# Patient Record
Sex: FEMALE | Race: ASIAN | NOT HISPANIC OR LATINO | URBAN - METROPOLITAN AREA
[De-identification: names, ages, dates, MRNs, and addresses within clinical notes are randomized per-mention and may not be internally consistent; named-entity substitution may affect disease eponyms.]

---

## 2017-11-20 ENCOUNTER — EMERGENCY (EMERGENCY)
Facility: HOSPITAL | Age: 62
LOS: 1 days | Discharge: HOME | End: 2017-11-20
Attending: EMERGENCY MEDICINE | Admitting: EMERGENCY MEDICINE
Payer: MEDICAID

## 2017-11-20 VITALS
DIASTOLIC BLOOD PRESSURE: 78 MMHG | RESPIRATION RATE: 18 BRPM | SYSTOLIC BLOOD PRESSURE: 117 MMHG | HEART RATE: 93 BPM | OXYGEN SATURATION: 96 % | TEMPERATURE: 98 F

## 2017-11-20 PROCEDURE — 99283 EMERGENCY DEPT VISIT LOW MDM: CPT | Mod: 25

## 2017-11-20 PROCEDURE — 99284 EMERGENCY DEPT VISIT MOD MDM: CPT | Mod: 25

## 2017-11-20 PROCEDURE — 90471 IMMUNIZATION ADMIN: CPT

## 2017-11-20 PROCEDURE — 90715 TDAP VACCINE 7 YRS/> IM: CPT

## 2017-11-20 RX ORDER — TETANUS TOXOID, REDUCED DIPHTHERIA TOXOID AND ACELLULAR PERTUSSIS VACCINE, ADSORBED 5; 2.5; 8; 8; 2.5 [IU]/.5ML; [IU]/.5ML; UG/.5ML; UG/.5ML; UG/.5ML
0.5 SUSPENSION INTRAMUSCULAR ONCE
Qty: 0 | Refills: 0 | Status: COMPLETED | OUTPATIENT
Start: 2017-11-20 | End: 2017-11-20

## 2017-11-20 RX ADMIN — Medication 1 TABLET(S): at 23:58

## 2017-11-20 RX ADMIN — TETANUS TOXOID, REDUCED DIPHTHERIA TOXOID AND ACELLULAR PERTUSSIS VACCINE, ADSORBED 0.5 MILLILITER(S): 5; 2.5; 8; 8; 2.5 SUSPENSION INTRAMUSCULAR at 23:58
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
## 2017-11-21 NOTE — ED PROVIDER NOTE - HEAD SHAPE
normal cephalic/+ swelling over left forehead near the hair line, no step off or deformity mildly tender to palpation.

## 2017-11-21 NOTE — ED PROVIDER NOTE - NEUROLOGICAL, MLM
Alert and oriented, no focal deficits, no motor or sensory deficits.  CN 2-12 intact with normal gait
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
## 2017-11-21 NOTE — ED PROVIDER NOTE - MUSCULOSKELETAL, MLM
no midline spinal tenderness.  5/5 strength b/l UE and LE.  Right index finger full ROM with good strength against resistance

## 2020-11-18 NOTE — ED PROVIDER NOTE - CPE EDP GASTRO NORM
normal... [Headache] : headache [Feeling Fatigued] : feeling fatigued [Sinus Pressure] : sinus pressure [Negative] : Respiratory [Sore Throat] : no sore throat

## 2022-03-09 NOTE — ED ADULT NURSE NOTE - PRIMARY CARE PROVIDER
Physical Therapy  Cash Based Dry Needling Session    Visit: 12  Dry Needling Informed Consent Signed: Yes  Patient has been made aware of the cash based cost associated with each of the above procedures: Yes    SUBJECTIVE   Improved lumbar range of motion into extension, rotation, sidebending, flexion still limited.    OBJECTIVE    +QL, ilisopasis R>L, thoracolumbar paraspinals    Treatment   Education about indications, contraindications and potential side effects completed with patient.  Screen Completed   Precautions Contraindications   Local skin lesions  Lyme disease  Local lymphedema  Severe hyperalgesia/allodynia  Metal allergies: nickel and chromium  Abnormal bleeding tendency  Immunodeficiency and/or compromised immune system  Second or third trimester of pregnancy  Vascular Disease  History of spontaneous pneumothorax Local or systemic infections; including the flu  Over implants  Active cancer  Area of lymphatic compromise  Area of lumpectomy/mastectomy  First trimester of pregnancy     Patient has consented to proceed with the intervention.    Dry Needling:   Needles inserted 16   Needles removed 16   Locations and Needle Size 60 mm T10,12,L2, L4  75 mm QL (1), iliopsoas (1)  75 mm glut med/max   Treatment duration 15 minutes   Patient response No adverse response       ASSESSMENT AND FUTURE RECOMMENDATIONS    Response to treatment: no adverse repsonse  Re-assessment of dysfunctions following intervention demonstrates: reduction in hypertonicity, improved range of motion    Based on the above recommendation is to: Continue Cash Based Service    CASH BASED THERAPY DAILY BILLING   Untimed Procedures:  Dry Needling - Unlisted Physical Medicine/Rehabilitation Service Or Procedure  Total time spent with patient: 15 minutes    
unk

## 2022-08-11 ENCOUNTER — EMERGENCY (EMERGENCY)
Facility: HOSPITAL | Age: 67
LOS: 1 days | Discharge: LEFT BEFORE DISCHARGE | End: 2022-08-11
Admitting: EMERGENCY MEDICINE

## 2022-08-11 VITALS
HEART RATE: 63 BPM | TEMPERATURE: 98 F | RESPIRATION RATE: 18 BRPM | DIASTOLIC BLOOD PRESSURE: 62 MMHG | SYSTOLIC BLOOD PRESSURE: 110 MMHG | OXYGEN SATURATION: 100 %

## 2022-08-11 NOTE — ED ADULT TRIAGE NOTE - CHIEF COMPLAINT QUOTE
Pt reporting to the ED  for hematuria and dysuria since last night. Reports lower abdominal pain radiating to flank. Denies anticoagulation use. No pmh

## 2024-09-06 ENCOUNTER — EMERGENCY (EMERGENCY)
Facility: HOSPITAL | Age: 69
LOS: 1 days | Discharge: HOME | End: 2024-09-06
Attending: STUDENT IN AN ORGANIZED HEALTH CARE EDUCATION/TRAINING PROGRAM
Payer: MEDICARE

## 2024-09-06 VITALS
WEIGHT: 132.28 LBS | SYSTOLIC BLOOD PRESSURE: 135 MMHG | RESPIRATION RATE: 18 BRPM | TEMPERATURE: 98 F | HEART RATE: 79 BPM | OXYGEN SATURATION: 100 % | DIASTOLIC BLOOD PRESSURE: 81 MMHG

## 2024-09-06 VITALS
SYSTOLIC BLOOD PRESSURE: 144 MMHG | OXYGEN SATURATION: 98 % | HEART RATE: 64 BPM | TEMPERATURE: 98 F | DIASTOLIC BLOOD PRESSURE: 76 MMHG | RESPIRATION RATE: 18 BRPM

## 2024-09-06 DIAGNOSIS — Z98.890 OTHER SPECIFIED POSTPROCEDURAL STATES: Chronic | ICD-10-CM

## 2024-09-06 DIAGNOSIS — Z90.49 ACQUIRED ABSENCE OF OTHER SPECIFIED PARTS OF DIGESTIVE TRACT: Chronic | ICD-10-CM

## 2024-09-06 LAB
ALBUMIN SERPL ELPH-MCNC: 3.8 G/DL — SIGNIFICANT CHANGE UP (ref 3.3–5)
ALP SERPL-CCNC: 73 U/L — SIGNIFICANT CHANGE UP (ref 40–120)
ALT FLD-CCNC: 23 U/L — SIGNIFICANT CHANGE UP (ref 10–45)
ANION GAP SERPL CALC-SCNC: 12 MMOL/L — SIGNIFICANT CHANGE UP (ref 5–17)
APPEARANCE UR: CLEAR — SIGNIFICANT CHANGE UP
AST SERPL-CCNC: 14 U/L — SIGNIFICANT CHANGE UP (ref 10–40)
BASOPHILS # BLD AUTO: 0.05 K/UL — SIGNIFICANT CHANGE UP (ref 0–0.2)
BASOPHILS NFR BLD AUTO: 0.6 % — SIGNIFICANT CHANGE UP (ref 0–2)
BILIRUB SERPL-MCNC: 0.5 MG/DL — SIGNIFICANT CHANGE UP (ref 0.2–1.2)
BILIRUB UR-MCNC: NEGATIVE — SIGNIFICANT CHANGE UP
BUN SERPL-MCNC: 9 MG/DL — SIGNIFICANT CHANGE UP (ref 7–23)
CALCIUM SERPL-MCNC: 9.2 MG/DL — SIGNIFICANT CHANGE UP (ref 8.4–10.5)
CHLORIDE SERPL-SCNC: 106 MMOL/L — SIGNIFICANT CHANGE UP (ref 96–108)
CO2 SERPL-SCNC: 21 MMOL/L — LOW (ref 22–31)
COLOR SPEC: YELLOW — SIGNIFICANT CHANGE UP
CREAT SERPL-MCNC: 0.65 MG/DL — SIGNIFICANT CHANGE UP (ref 0.5–1.3)
DIFF PNL FLD: NEGATIVE — SIGNIFICANT CHANGE UP
EGFR: 94 ML/MIN/1.73M2 — SIGNIFICANT CHANGE UP
EOSINOPHIL # BLD AUTO: 0.07 K/UL — SIGNIFICANT CHANGE UP (ref 0–0.5)
EOSINOPHIL NFR BLD AUTO: 0.8 % — SIGNIFICANT CHANGE UP (ref 0–6)
FLUAV AG NPH QL: SIGNIFICANT CHANGE UP
FLUBV AG NPH QL: SIGNIFICANT CHANGE UP
GAS PNL BLDV: SIGNIFICANT CHANGE UP
GLUCOSE SERPL-MCNC: 137 MG/DL — HIGH (ref 70–99)
GLUCOSE UR QL: NEGATIVE MG/DL — SIGNIFICANT CHANGE UP
HCT VFR BLD CALC: 42.7 % — SIGNIFICANT CHANGE UP (ref 34.5–45)
HGB BLD-MCNC: 14 G/DL — SIGNIFICANT CHANGE UP (ref 11.5–15.5)
IMM GRANULOCYTES NFR BLD AUTO: 0.8 % — SIGNIFICANT CHANGE UP (ref 0–0.9)
KETONES UR-MCNC: NEGATIVE MG/DL — SIGNIFICANT CHANGE UP
LEUKOCYTE ESTERASE UR-ACNC: NEGATIVE — SIGNIFICANT CHANGE UP
LIDOCAIN IGE QN: 86 U/L — HIGH (ref 7–60)
LYMPHOCYTES # BLD AUTO: 2.12 K/UL — SIGNIFICANT CHANGE UP (ref 1–3.3)
LYMPHOCYTES # BLD AUTO: 23.5 % — SIGNIFICANT CHANGE UP (ref 13–44)
MCHC RBC-ENTMCNC: 28.3 PG — SIGNIFICANT CHANGE UP (ref 27–34)
MCHC RBC-ENTMCNC: 32.8 GM/DL — SIGNIFICANT CHANGE UP (ref 32–36)
MCV RBC AUTO: 86.3 FL — SIGNIFICANT CHANGE UP (ref 80–100)
MONOCYTES # BLD AUTO: 0.68 K/UL — SIGNIFICANT CHANGE UP (ref 0–0.9)
MONOCYTES NFR BLD AUTO: 7.5 % — SIGNIFICANT CHANGE UP (ref 2–14)
NEUTROPHILS # BLD AUTO: 6.05 K/UL — SIGNIFICANT CHANGE UP (ref 1.8–7.4)
NEUTROPHILS NFR BLD AUTO: 66.8 % — SIGNIFICANT CHANGE UP (ref 43–77)
NITRITE UR-MCNC: NEGATIVE — SIGNIFICANT CHANGE UP
NRBC # BLD: 0 /100 WBCS — SIGNIFICANT CHANGE UP (ref 0–0)
OB PNL STL: NEGATIVE — SIGNIFICANT CHANGE UP
PH UR: 5.5 — SIGNIFICANT CHANGE UP (ref 5–8)
PLATELET # BLD AUTO: 316 K/UL — SIGNIFICANT CHANGE UP (ref 150–400)
POTASSIUM SERPL-MCNC: 3.8 MMOL/L — SIGNIFICANT CHANGE UP (ref 3.5–5.3)
POTASSIUM SERPL-SCNC: 3.8 MMOL/L — SIGNIFICANT CHANGE UP (ref 3.5–5.3)
PROT SERPL-MCNC: 7.2 G/DL — SIGNIFICANT CHANGE UP (ref 6–8.3)
PROT UR-MCNC: NEGATIVE MG/DL — SIGNIFICANT CHANGE UP
RBC # BLD: 4.95 M/UL — SIGNIFICANT CHANGE UP (ref 3.8–5.2)
RBC # FLD: 12.2 % — SIGNIFICANT CHANGE UP (ref 10.3–14.5)
RSV RNA NPH QL NAA+NON-PROBE: SIGNIFICANT CHANGE UP
SARS-COV-2 RNA SPEC QL NAA+PROBE: SIGNIFICANT CHANGE UP
SODIUM SERPL-SCNC: 139 MMOL/L — SIGNIFICANT CHANGE UP (ref 135–145)
SP GR SPEC: 1.01 — SIGNIFICANT CHANGE UP (ref 1–1.03)
TROPONIN T, HIGH SENSITIVITY RESULT: <6 NG/L — SIGNIFICANT CHANGE UP (ref 0–51)
UROBILINOGEN FLD QL: 0.2 MG/DL — SIGNIFICANT CHANGE UP (ref 0.2–1)
WBC # BLD: 9.04 K/UL — SIGNIFICANT CHANGE UP (ref 3.8–10.5)
WBC # FLD AUTO: 9.04 K/UL — SIGNIFICANT CHANGE UP (ref 3.8–10.5)

## 2024-09-06 PROCEDURE — 82330 ASSAY OF CALCIUM: CPT

## 2024-09-06 PROCEDURE — 85018 HEMOGLOBIN: CPT

## 2024-09-06 PROCEDURE — 80053 COMPREHEN METABOLIC PANEL: CPT

## 2024-09-06 PROCEDURE — 74177 CT ABD & PELVIS W/CONTRAST: CPT | Mod: MC

## 2024-09-06 PROCEDURE — 84484 ASSAY OF TROPONIN QUANT: CPT

## 2024-09-06 PROCEDURE — 74177 CT ABD & PELVIS W/CONTRAST: CPT | Mod: 26,MC

## 2024-09-06 PROCEDURE — 82272 OCCULT BLD FECES 1-3 TESTS: CPT

## 2024-09-06 PROCEDURE — 76700 US EXAM ABDOM COMPLETE: CPT | Mod: 26

## 2024-09-06 PROCEDURE — 71045 X-RAY EXAM CHEST 1 VIEW: CPT | Mod: 26

## 2024-09-06 PROCEDURE — 99285 EMERGENCY DEPT VISIT HI MDM: CPT

## 2024-09-06 PROCEDURE — 83605 ASSAY OF LACTIC ACID: CPT

## 2024-09-06 PROCEDURE — 85014 HEMATOCRIT: CPT

## 2024-09-06 PROCEDURE — 82947 ASSAY GLUCOSE BLOOD QUANT: CPT

## 2024-09-06 PROCEDURE — 84295 ASSAY OF SERUM SODIUM: CPT

## 2024-09-06 PROCEDURE — 81003 URINALYSIS AUTO W/O SCOPE: CPT

## 2024-09-06 PROCEDURE — 84132 ASSAY OF SERUM POTASSIUM: CPT

## 2024-09-06 PROCEDURE — 71045 X-RAY EXAM CHEST 1 VIEW: CPT

## 2024-09-06 PROCEDURE — 99284 EMERGENCY DEPT VISIT MOD MDM: CPT | Mod: 25

## 2024-09-06 PROCEDURE — 82435 ASSAY OF BLOOD CHLORIDE: CPT

## 2024-09-06 PROCEDURE — 76700 US EXAM ABDOM COMPLETE: CPT

## 2024-09-06 PROCEDURE — 83690 ASSAY OF LIPASE: CPT

## 2024-09-06 PROCEDURE — 87637 SARSCOV2&INF A&B&RSV AMP PRB: CPT

## 2024-09-06 PROCEDURE — 85025 COMPLETE CBC W/AUTO DIFF WBC: CPT

## 2024-09-06 PROCEDURE — 36000 PLACE NEEDLE IN VEIN: CPT | Mod: XU

## 2024-09-06 PROCEDURE — 82803 BLOOD GASES ANY COMBINATION: CPT

## 2024-09-06 RX ORDER — SODIUM CHLORIDE 9 MG/ML
1000 INJECTION INTRAMUSCULAR; INTRAVENOUS; SUBCUTANEOUS ONCE
Refills: 0 | Status: COMPLETED | OUTPATIENT
Start: 2024-09-06 | End: 2024-09-06

## 2024-09-06 RX ORDER — ASPIRIN 81 MG
0 TABLET, DELAYED RELEASE (ENTERIC COATED) ORAL
Refills: 0 | DISCHARGE

## 2024-09-06 RX ADMIN — SODIUM CHLORIDE 1000 MILLILITER(S): 9 INJECTION INTRAMUSCULAR; INTRAVENOUS; SUBCUTANEOUS at 16:55

## 2024-09-06 NOTE — ED ADULT NURSE NOTE - NSFALLHARMRISKINTERV_ED_ALL_ED

## 2024-09-06 NOTE — ED PROVIDER NOTE - OBJECTIVE STATEMENT
The patient is a 69y Female with PMHx colonic polyps presents with abdominal pain since 8/26 (1.5 weeks) primarily localized to the RUQ, melena, diarrhea, hematuria, dysuria, urinary urgency, difficulty urinating, chills, sweating, fever, runny nose, throat pain, headache, and generalized weakness. On 8/26, she ate crab meat, to which she attributes her illness. On 8/29 and 9/3, she was seen at [facility] emergency department for diarrhea where she was found to have campylobacter with otherwise normal bloodwork aside from monocyte elevation and normal CT abdomen, was prescribed azithromycin, zofran, acetaminophen. On 8/29, she stopped having bowel movements for 5 days, but following this she began experiencing melena and diarrhea resumed. In addition to azithromycin, she has been taking moroxydine, an antiviral medication. In March, she had a routine colonoscopy and was found to have 2 polyps which were removed by her GI DrReanna Carlin. She also has medical history of hypertension, unsure what medications she is taking, and an appendectomy at age 18.

## 2024-09-06 NOTE — ED ADULT NURSE NOTE - OBJECTIVE STATEMENT
1515 pt 69yf aox4 ambulatory mandarin spkg only daughter at bedside/translating c/o 3 days abd pain and bloody stools, has recently chg pmd and not seen or called, vital wnl, in no acute distress able to verbalize concerns, pending

## 2024-09-06 NOTE — ED PROVIDER NOTE - PHYSICAL EXAMINATION
T(C): 36.8 (09-06-24 @ 13:55), Max: 36.8 (09-06-24 @ 13:55)  HR: 79 (09-06-24 @ 13:55) (79 - 79)  BP: 135/81 (09-06-24 @ 13:55) (135/81 - 135/81)  RR: 18 (09-06-24 @ 13:55) (18 - 18)  SpO2: 100% (09-06-24 @ 13:55) (100% - 100%)    CONSTITUTIONAL: Well groomed, no apparent distress  EYES: No conjunctival or scleral injection, non-icteric  ENMT: Oral mucosa with moist membranes. Normal dentition; no pharyngeal injection or exudates  RESP: No respiratory distress, no use of accessory muscles  GI: Tender with RUQ guarding; no palpable masses; no hepatosplenomegaly; no hernia palpated  : Endorses mild costovertebral angle tenderness bilaterally  MSK: Slow gait, able to walk, abnormal gait per daughter.  SKIN: No rashes or ulcers noted; no subcutaneous nodules or induration palpable

## 2024-09-06 NOTE — ED PROVIDER NOTE - PATIENT PORTAL LINK FT
You can access the FollowMyHealth Patient Portal offered by [facility] by registering at the following website: [email or web address]. By joining [facility]’s FollowMyHealth portal, you will also be able to view your health information using other applications (apps) compatible with our system.

## 2024-09-06 NOTE — ED PROVIDER NOTE - NSICDXPASTMEDICALHX_GEN_ALL_CORE_FT
PAST MEDICAL HISTORY:  History of colonic polyps     HTN (hypertension)     No pertinent past medical history

## 2024-09-06 NOTE — ED PROVIDER NOTE - PROGRESS NOTE DETAILS
John Riley MD PGY-3: PO challenge passed. CT shows gastritis. Incidental findings discussed with patient and amoler at bedside. US shows gallbladder sludge. Pain improved. Abdomen nontender. Offerred surg consult here but patient wants to go home. Will DC

## 2024-09-06 NOTE — ED PROVIDER NOTE - ATTENDING CONTRIBUTION TO CARE
69-year-old female who is accompanied by her daughter Jose A and using  for [employer] [place] [id number] presents to the emergency department with abdominal pain for 3 days and had bloody stool.  Patient recently changed primary care doctor and has not seen.  Patient was recently admitted at [facility] on 829 and 9 3 at that time she was found to have Campylobacter.  Patient was prescribed azithromycin.  Patient states that she stopped having bowel movements initially on 829.  She also has been taking an antiviral medication for her symptoms.  Patient follows with a GI doctor shall.  Patient is unsure what medications she takes some of them include Chinese medication.  Patient is awake alert oriented x 3 she has soft abdomen with left lower quadrant tenderness.  She has bilateral CVA tenderness.  Patient will have labs imaging of the abdomen including CT imaging and ultrasound to assess for etiology of her pain.  Patient had a GI bleed study that showed no active bleeding.  Patient with hemoglobin of 14 fecal occult negative patient hemodynamically stable.  Has slightly elevated lipase level.  Patient had ultrasound that reveals biliary sludge patient informed of test results prefers to go home as she tolerated p.o. without any difficulty.  Patient aware that if she develops nausea vomiting or any other concerning symptoms should return to the emergency department immediately for further imaging and likely surgical consultation for possible infection in her gallbladder.

## 2024-09-06 NOTE — ED PROVIDER NOTE - NSFOLLOWUPINSTRUCTIONS_ED_ALL_ED_FT
You were evaluated in the emergency department for abdominal pain.  Your results were discussed with you and are attached.  Please follow-up with your primary care doctor as discussed within the next 2 days.  You may also follow-up with a general surgeon for further evaluation and management of your gallbladder.  You may also follow-up with the urologist for urinary symptoms.  You did not have any evidence of blood in your stool at this time and you did not have a urinary tract infection at this time.    You may take 975 mg Tylenol (acetaminophen) every six hours as needed for pain.    You can expect a phone call within the next 2-3 business days to help you make an appointment. Clinic information is also attached. You may call yourself to schedule an appointment  if you do not hear from anyone.     Abdominal Pain    Many things can cause abdominal pain. Many times, abdominal pain is not caused by a disease and will improve without treatment. Your health care provider will do a physical exam to determine if there is a dangerous cause of your pain; blood tests and imaging may help determine the cause of your pain. However, in many cases, no cause may be found and you may need further testing as an outpatient. Monitor your abdominal pain for any changes.     SEEK IMMEDIATE MEDICAL CARE IF YOU HAVE ANY OF THE FOLLOWING SYMPTOMS: worsening abdominal pain, uncontrollable vomiting, profuse diarrhea, inability to have bowel movements or pass gas, black or bloody stools, fever accompanying chest pain or back pain, or fainting. These symptoms may represent a serious problem that is an emergency. Do not wait to see if the symptoms will go away. Get medical help right away. Call 911 and do not drive yourself to the hospital. You were evaluated in the emergency department for abdominal pain.  Your results were discussed with you and are attached.  Please follow-up with your primary care doctor as discussed within the next 2 days.  You may also follow-up with a general surgeon for further evaluation and management of your gallbladder.  You may also follow-up with the urologist for urinary symptoms.  You did not have any evidence of blood in your stool at this time and you did not have a urinary tract infection at this time.    Continue your prescribed medications.    You may take 975 mg Tylenol (acetaminophen) every six hours as needed for pain.    You can expect a phone call within the next 2-3 business days to help you make an appointment. Clinic information is also attached. You may call yourself to schedule an appointment  if you do not hear from anyone.     Abdominal Pain    Many things can cause abdominal pain. Many times, abdominal pain is not caused by a disease and will improve without treatment. Your health care provider will do a physical exam to determine if there is a dangerous cause of your pain; blood tests and imaging may help determine the cause of your pain. However, in many cases, no cause may be found and you may need further testing as an outpatient. Monitor your abdominal pain for any changes.     SEEK IMMEDIATE MEDICAL CARE IF YOU HAVE ANY OF THE FOLLOWING SYMPTOMS: worsening abdominal pain, uncontrollable vomiting, profuse diarrhea, inability to have bowel movements or pass gas, black or bloody stools, fever accompanying chest pain or back pain, or fainting. These symptoms may represent a serious problem that is an emergency. Do not wait to see if the symptoms will go away. Get medical help right away. Call 911 and do not drive yourself to the hospital.    Rest, drink plenty of fluids.  Advance activity as tolerated.  Continue all previously prescribed medications as directed.  Follow up with your primary care physician in 48-72 hours- bring copies of your results.  Return to the ER for worsening or persistent symptoms, and/or ANY NEW OR CONCERNING SYMPTOMS. If you have issues obtaining follow up, please call: [phone] to obtain a doctor or specialist who takes your insurance in your area.